# Patient Record
Sex: MALE | Race: WHITE | NOT HISPANIC OR LATINO | Employment: FULL TIME | URBAN - METROPOLITAN AREA
[De-identification: names, ages, dates, MRNs, and addresses within clinical notes are randomized per-mention and may not be internally consistent; named-entity substitution may affect disease eponyms.]

---
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
## 2022-04-18 DIAGNOSIS — F90.9 ATTENTION DEFICIT HYPERACTIVITY DISORDER (ADHD), UNSPECIFIED ADHD TYPE: ICD-10-CM

## 2022-04-18 RX ORDER — LISDEXAMFETAMINE DIMESYLATE 40 MG/1
40 CAPSULE ORAL EVERY MORNING
Qty: 90 CAPSULE | Refills: 0 | Status: SHIPPED | OUTPATIENT
Start: 2022-04-18 | End: 2022-10-13

## 2022-04-18 NOTE — TELEPHONE ENCOUNTER
Pt's mother on C2C called     Requesting refill on Vyvanse - pt does not take this every day     Last Rx 8/18/21 #90     Last OV 8/18/21     Routing refill request to provider for review/approval because:  Drug not on the [facility] refill protocol     Adilene RAMSAY, Triage RN  [facility]

## 2022-10-13 ENCOUNTER — TELEPHONE (OUTPATIENT)
Dept: FAMILY MEDICINE | Facility: CLINIC | Age: 22
End: 2022-10-13

## 2022-10-13 DIAGNOSIS — F90.9 ATTENTION DEFICIT HYPERACTIVITY DISORDER (ADHD), UNSPECIFIED ADHD TYPE: ICD-10-CM

## 2022-10-13 RX ORDER — LISDEXAMFETAMINE DIMESYLATE 40 MG/1
40 CAPSULE ORAL EVERY MORNING
Qty: 90 CAPSULE | Refills: 0 | Status: SHIPPED | OUTPATIENT
Start: 2022-10-13 | End: 2023-05-01

## 2023-05-01 DIAGNOSIS — F90.9 ATTENTION DEFICIT HYPERACTIVITY DISORDER (ADHD), UNSPECIFIED ADHD TYPE: ICD-10-CM

## 2023-05-01 RX ORDER — LISDEXAMFETAMINE DIMESYLATE 40 MG/1
40 CAPSULE ORAL EVERY MORNING
Qty: 90 CAPSULE | Refills: 0 | Status: SHIPPED | OUTPATIENT
Start: 2023-05-01 | End: 2023-10-31

## 2023-05-01 NOTE — TELEPHONE ENCOUNTER
Medication Question or Refill    Contacts       Type Contact Phone/Fax    05/01/2023 11:58 AM CDT Phone (Incoming) Kenneth Abdi (Self) [phone] (M)          What medication are you calling about (include dose and sig)?:  Vyvanse 40mg #30    Preferred Pharmacy:   [facility]  Controlled Substance Agreement on file:   CSA -- Patient Level:    CSA: None found at the patient level.       Who prescribed the medication?:  Dr adams    Do you need a refill? Will schedule an appt when back from school    When did you use the medication last? daily    Patient offered an appointment? Yes: pt wanted a virual appt but in [place] and will wait till he comes home.  Or I stated est care in [place].    Do you have any questions or concerns?  No      Okay to leave a detailed message?: Yes at Cell number on file:    Telephone Information:   Mobile [phone]

## 2023-10-31 DIAGNOSIS — F90.9 ATTENTION DEFICIT HYPERACTIVITY DISORDER (ADHD), UNSPECIFIED ADHD TYPE: ICD-10-CM

## 2023-10-31 RX ORDER — LISDEXAMFETAMINE DIMESYLATE 40 MG/1
40 CAPSULE ORAL EVERY MORNING
Qty: 90 CAPSULE | Refills: 0 | Status: SHIPPED | OUTPATIENT
Start: 2023-10-31 | End: 2023-11-22

## 2023-10-31 NOTE — TELEPHONE ENCOUNTER
TO PCP    Patient calling for vyvanse rx refill. States he didn't take it during the summer because he did not have any school obligations, but now is needing the refill. Patient states he is completely out as of today.    Rx and pharmacy pended for your Review.     Ellie Santiago RN on 10/31/2023 at 3:04 PM

## 2023-11-17 ENCOUNTER — TELEPHONE (OUTPATIENT)
Dept: FAMILY MEDICINE | Facility: CLINIC | Age: 23
End: 2023-11-17
Payer: COMMERCIAL

## 2023-11-17 DIAGNOSIS — F90.9 ATTENTION DEFICIT HYPERACTIVITY DISORDER (ADHD), UNSPECIFIED ADHD TYPE: Primary | ICD-10-CM

## 2023-11-17 NOTE — TELEPHONE ENCOUNTER
Has been unable to refill Vyvanse at [facility] in [place].  Patient states that he has been considering decreasing dose.   He has virtual follow up with Dr. Wang next week when he is in town for Thanksgiving. Lorraine Faith RN

## 2023-11-22 ENCOUNTER — VIRTUAL VISIT (OUTPATIENT)
Dept: FAMILY MEDICINE | Facility: CLINIC | Age: 23
End: 2023-11-22
Payer: COMMERCIAL

## 2023-11-22 DIAGNOSIS — F90.9 ATTENTION DEFICIT HYPERACTIVITY DISORDER (ADHD), UNSPECIFIED ADHD TYPE: ICD-10-CM

## 2023-11-22 PROCEDURE — 99213 OFFICE O/P EST LOW 20 MIN: CPT | Mod: VID | Performed by: INTERNAL MEDICINE

## 2023-11-22 RX ORDER — LISDEXAMFETAMINE DIMESYLATE 40 MG/1
40 CAPSULE ORAL EVERY MORNING
Qty: 30 CAPSULE | Refills: 0 | Status: SHIPPED | OUTPATIENT
Start: 2023-11-22 | End: 2023-12-18

## 2023-11-22 NOTE — PROGRESS NOTES
Kenneth is a 23 year old who is being evaluated via a billable video visit.      How would you like to obtain your AVS? Mail a copy  If the video visit is dropped, the invitation should be resent by: Text to cell phone: [phone]  Will anyone else be joining your video visit? No          Assessment & Plan     Attention deficit hyperactivity disorder (ADHD), unspecified ADHD type  Continue current Vyvanse  90 day supply next fill to facilitate study abroad   - VYVANSE 40 MG capsule; Take 1 capsule (40 mg) by mouth every morning      No LOS data to display   Time spent by me doing chart review, history and exam, documentation and further activities per the note       FUTURE APPOINTMENTS:       - Follow-up visit  in person 6 /2024; return sooner as needed       Miki Wang MD  [facility]    Rodney Cooper is a 23 year old, presenting for the following health issues:  Recheck Medication (Patient is having a video virtual visit to follow up with ADHD medications.)      History of Present Illness       Reason for visit:  Medication for ADHD.    He eats 0-1 servings of fruits and vegetables daily.He consumes 0 sweetened beverage(s) daily.He exercises with enough effort to increase his heart rate 60 or more minutes per day.  He exercises with enough effort to increase his heart rate 5 days per week.   He is not taking prescribed medications regularly due to other.         Needs refill on Vyvanse  Working well without side effects, but he is having trouble finding pharmacy with supply  Also he is studying abroad in [place] starting end of Dec, so requests 90 day supply to travel with next fill       Review of Systems         Objective           Vitals:  No vitals were obtained today due to virtual visit.    Physical Exam   GENERAL: Healthy, alert and no distress  EYES: Eyes grossly normal to inspection.  No discharge or erythema, or obvious scleral/conjunctival abnormalities.  RESP: No audible  wheeze, cough, or visible cyanosis.  No visible retractions or increased work of breathing.    SKIN: Visible skin clear. No significant rash, abnormal pigmentation or lesions.  NEURO: Cranial nerves grossly intact.  Mentation and speech appropriate for age.  PSYCH: Mentation appears normal, affect normal/bright, judgement and insight intact, normal speech and appearance well-groomed.                Video-Visit Details    Type of service:  Video Visit     Originating Location (pt. Location): Home    Distant Location (provider location):  On-site  Platform used for Video Visit: [employer]

## 2023-12-18 DIAGNOSIS — F90.9 ATTENTION DEFICIT HYPERACTIVITY DISORDER (ADHD), UNSPECIFIED ADHD TYPE: ICD-10-CM

## 2023-12-18 RX ORDER — LISDEXAMFETAMINE DIMESYLATE 40 MG/1
40 CAPSULE ORAL EVERY MORNING
Qty: 30 CAPSULE | Refills: 0 | Status: SHIPPED | OUTPATIENT
Start: 2023-12-18 | End: 2024-07-17

## 2023-12-20 RX ORDER — LISDEXAMFETAMINE DIMESYLATE 40 MG/1
40 CAPSULE ORAL DAILY
Qty: 90 CAPSULE | Refills: 0 | Status: SHIPPED | OUTPATIENT
Start: 2023-12-20 | End: 2024-03-19

## 2023-12-20 NOTE — TELEPHONE ENCOUNTER
TO PCP    Pt requesting 90 day supply of vyvanse medication as he will be studying abroad and unable to  medications while there.    Pharmacy filled 30 day supply on 12/18/23, which pt has not filled.     Pharmacy pended for your approval.    SUSAN RIVERA RN on 12/20/2023 at 1:18 PM

## 2023-12-20 NOTE — TELEPHONE ENCOUNTER
Prescription sent   Picato Counseling:  I discussed with the patient the risks of Picato including but not limited to erythema, scaling, itching, weeping, crusting, and pain.

## 2024-07-17 ENCOUNTER — VIRTUAL VISIT (OUTPATIENT)
Dept: FAMILY MEDICINE | Facility: CLINIC | Age: 24
End: 2024-07-17
Payer: COMMERCIAL

## 2024-07-17 DIAGNOSIS — F90.9 ATTENTION DEFICIT HYPERACTIVITY DISORDER (ADHD), UNSPECIFIED ADHD TYPE: Primary | ICD-10-CM

## 2024-07-17 PROCEDURE — 99213 OFFICE O/P EST LOW 20 MIN: CPT | Mod: 95 | Performed by: INTERNAL MEDICINE

## 2024-07-17 RX ORDER — CLINDAMYCIN HCL 300 MG
300 CAPSULE ORAL
COMMUNITY
Start: 2024-07-10

## 2024-07-17 RX ORDER — TRIAMCINOLONE ACETONIDE 0.1 %
PASTE (GRAM) DENTAL
COMMUNITY
Start: 2024-07-10

## 2024-07-17 RX ORDER — DEXTROAMPHETAMINE SACCHARATE, AMPHETAMINE ASPARTATE, DEXTROAMPHETAMINE SULFATE AND AMPHETAMINE SULFATE 2.5; 2.5; 2.5; 2.5 MG/1; MG/1; MG/1; MG/1
10 TABLET ORAL 2 TIMES DAILY
Qty: 60 TABLET | Refills: 0 | Status: SHIPPED | OUTPATIENT
Start: 2024-07-17

## 2024-07-17 NOTE — PROGRESS NOTES
Kenneth is a 24 year old who is being evaluated via a billable video visit.    What phone number would you like to be contacted at? [phone]   How would you like to obtain your AVS? Shy      Assessment & Plan     Attention deficit hyperactivity disorder (ADHD), unspecified ADHD type  I think it is reasonable to switch from a long-acting stimulant to a short acting stimulant at roughly equivalent dose as below, side effects and risks discussed, follow-up in 3 to 4 weeks to assess therapy and adjust dose before he heads back to school in the fall  - amphetamine-dextroamphetamine (ADDERALL) 10 MG tablet; Take 1 tablet (10 mg) by mouth 2 times daily            FUTURE APPOINTMENTS:       - Follow-up with whom? Me Follow-Up for what? Chronic Disease f/u Chronic Disease f/u: General (Other) Additional Details: ADHD How? In Person or Virtual Future, Expected: 8/17/2024 Approximate, Patient instructed to return to clinic or contact us sooner if symptoms worsen or new symptoms develop.     Subjective   Kenneth is a 24 year old, presenting for the following health issues:  Recheck Medication (Possible side effects from Vyvanse)    History of Present Illness       Reason for visit:  MED CHECK    He eats 0-1 servings of fruits and vegetables daily.He consumes 0 sweetened beverage(s) daily.He exercises with enough effort to increase his heart rate 60 or more minutes per day.  He exercises with enough effort to increase his heart rate 5 days per week. He is missing 5 dose(s) of medications per week.  He is not taking prescribed medications regularly due to other.       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  Previously treated with Vyvanse 40 mg once daily  He finds himself skipping Vyvanse doses frequently because he does not like side effects  Particularly, he is troubled by insomnia and appetite suppression late in the day  He would like to try an immediate release stimulant to give him more flexibility in dosing and to  "hopefully avoid nocturnal side effects  He had been on Adderall in the past            Objective    Vitals - Patient Reported  Weight (Patient Reported): 72.6 kg (160 lb)  Height (Patient Reported): 172.7 cm (5' 8\")  BMI (Based on Pt Reported Ht/Wt): 24.33  Pain Score: No Pain (0)        Physical Exam   GENERAL: alert and no distress  EYES: Eyes grossly normal to inspection.  No discharge or erythema, or obvious scleral/conjunctival abnormalities.  RESP: No audible wheeze, cough, or visible cyanosis.    SKIN: Visible skin clear. No significant rash, abnormal pigmentation or lesions.  NEURO: Cranial nerves grossly intact.  Mentation and speech appropriate for age.  PSYCH: Appropriate affect, tone, and pace of words          Video-Visit Details    Type of service:  Video Visit   Originating Location (pt. Location): Home    Distant Location (provider location):  On-site  Platform used for Video Visit: Shu  Signed Electronically by: Miki Wang MD    "

## 2025-06-26 ENCOUNTER — TELEPHONE (OUTPATIENT)
Dept: FAMILY MEDICINE | Facility: CLINIC | Age: 25
End: 2025-06-26
Payer: COMMERCIAL

## 2025-06-26 DIAGNOSIS — F90.9 ATTENTION DEFICIT HYPERACTIVITY DISORDER (ADHD), UNSPECIFIED ADHD TYPE: ICD-10-CM

## 2025-06-26 RX ORDER — DEXTROAMPHETAMINE SACCHARATE, AMPHETAMINE ASPARTATE, DEXTROAMPHETAMINE SULFATE AND AMPHETAMINE SULFATE 2.5; 2.5; 2.5; 2.5 MG/1; MG/1; MG/1; MG/1
10 TABLET ORAL 2 TIMES DAILY
Qty: 60 TABLET | Refills: 0 | Status: SHIPPED | OUTPATIENT
Start: 2025-06-26

## 2025-06-26 NOTE — TELEPHONE ENCOUNTER
Pt states he stopped taking the medication for a bit, started taking it again and now is out. Last prescription was only supply for 1 month in 7/2024.    Pt states he leaves town 6/29/25 until 8/8/25. Upcoming physical scheduled 8/13/25.  Virtual visit needed?    Karol Hilario RN